# Patient Record
Sex: FEMALE | Race: BLACK OR AFRICAN AMERICAN | NOT HISPANIC OR LATINO | Employment: UNEMPLOYED | URBAN - METROPOLITAN AREA
[De-identification: names, ages, dates, MRNs, and addresses within clinical notes are randomized per-mention and may not be internally consistent; named-entity substitution may affect disease eponyms.]

---

## 2020-02-19 ENCOUNTER — HOSPITAL ENCOUNTER (OUTPATIENT)
Dept: RADIOLOGY | Facility: HOSPITAL | Age: 50
Discharge: HOME | End: 2020-02-19
Attending: ORTHOPAEDIC SURGERY
Payer: MEDICAID

## 2020-02-19 ENCOUNTER — OFFICE VISIT (OUTPATIENT)
Dept: ORTHOPEDICS | Facility: CLINIC | Age: 50
End: 2020-02-19
Payer: MEDICAID

## 2020-02-19 VITALS — BODY MASS INDEX: 19.14 KG/M2 | HEIGHT: 63 IN | WEIGHT: 108 LBS

## 2020-02-19 DIAGNOSIS — R52 PAIN: ICD-10-CM

## 2020-02-19 DIAGNOSIS — M79.671 CHRONIC HEEL PAIN, RIGHT: ICD-10-CM

## 2020-02-19 DIAGNOSIS — R52 PAIN: Primary | ICD-10-CM

## 2020-02-19 DIAGNOSIS — M21.6X1 CALCANEUS DEFORMITY, ACQUIRED, RIGHT: ICD-10-CM

## 2020-02-19 DIAGNOSIS — G89.29 CHRONIC HEEL PAIN, RIGHT: ICD-10-CM

## 2020-02-19 PROCEDURE — 99203 OFFICE O/P NEW LOW 30 MIN: CPT | Mod: S$PBB,,, | Performed by: ORTHOPAEDIC SURGERY

## 2020-02-19 PROCEDURE — 73650 X-RAY EXAM OF HEEL: CPT | Mod: 26,RT,, | Performed by: RADIOLOGY

## 2020-02-19 PROCEDURE — 99999 PR PBB SHADOW E&M-NEW PATIENT-LVL III: CPT | Mod: PBBFAC,,, | Performed by: ORTHOPAEDIC SURGERY

## 2020-02-19 PROCEDURE — 99203 OFFICE O/P NEW LOW 30 MIN: CPT | Mod: PBBFAC,25 | Performed by: ORTHOPAEDIC SURGERY

## 2020-02-19 PROCEDURE — 73650 X-RAY EXAM OF HEEL: CPT | Mod: TC,RT

## 2020-02-19 PROCEDURE — 99999 PR PBB SHADOW E&M-NEW PATIENT-LVL III: ICD-10-PCS | Mod: PBBFAC,,, | Performed by: ORTHOPAEDIC SURGERY

## 2020-02-19 PROCEDURE — 73650 XR CALCANEUS 2 VIEW RIGHT: ICD-10-PCS | Mod: 26,RT,, | Performed by: RADIOLOGY

## 2020-02-19 PROCEDURE — 99203 PR OFFICE/OUTPT VISIT, NEW, LEVL III, 30-44 MIN: ICD-10-PCS | Mod: S$PBB,,, | Performed by: ORTHOPAEDIC SURGERY

## 2020-02-19 RX ORDER — CARVEDILOL 12.5 MG/1
12.5 TABLET ORAL 2 TIMES DAILY
COMMUNITY

## 2020-02-19 RX ORDER — BENAZEPRIL HYDROCHLORIDE 20 MG/1
20 TABLET ORAL DAILY
COMMUNITY

## 2020-02-19 RX ORDER — LORATADINE 10 MG/1
10 TABLET ORAL DAILY
COMMUNITY

## 2020-02-19 RX ORDER — AMLODIPINE BESYLATE 10 MG/1
10 TABLET ORAL DAILY
COMMUNITY
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
## 2020-03-16 ENCOUNTER — TELEPHONE (OUTPATIENT)
Dept: ORTHOPEDICS | Facility: CLINIC | Age: 50
End: 2020-03-16

## 2020-03-16 NOTE — TELEPHONE ENCOUNTER
----- Message from Pawan Peña sent at 3/16/2020 10:27 AM CDT -----  Contact: self  Mg  Pt returning to call Mary concerning her surgery .    Contact

## 2020-03-19 ENCOUNTER — TELEPHONE (OUTPATIENT)
Dept: ORTHOPEDICS | Facility: CLINIC | Age: 50
End: 2020-03-19

## 2020-03-19 NOTE — TELEPHONE ENCOUNTER
Spoke to patient, postponed surgery and pre/post op appointments until further notice due to COVID 19 concerns. Patient verbalized understanding.

## 2020-05-08 ENCOUNTER — TELEPHONE (OUTPATIENT)
Dept: ORTHOPEDICS | Facility: CLINIC | Age: 50
End: 2020-05-08

## 2020-06-03 DIAGNOSIS — G89.29 CHRONIC HEEL PAIN, RIGHT: ICD-10-CM

## 2020-06-03 DIAGNOSIS — M79.671 CHRONIC HEEL PAIN, RIGHT: ICD-10-CM

## 2020-06-03 DIAGNOSIS — M21.6X1 CALCANEUS DEFORMITY, ACQUIRED, RIGHT: Primary | ICD-10-CM

## 2020-06-04 DIAGNOSIS — Z01.818 PRE-OP TESTING: Primary | ICD-10-CM

## 2020-06-15 ENCOUNTER — ANESTHESIA EVENT (OUTPATIENT)
Dept: SURGERY | Facility: HOSPITAL | Age: 50
End: 2020-06-15
Payer: MEDICAID

## 2020-06-15 NOTE — ANESTHESIA PREPROCEDURE EVALUATION
Chart review complete. Patient's medical history reviewed.  OK to proceed at [facility].   6/15/2020                                                                                                                   06/15/2020  Rosy Colin is a 50 y.o., female.    Active Ambulatory Problems     Diagnosis Date Noted    Calcaneus deformity, acquired, right 06/03/2020     Resolved Ambulatory Problems     Diagnosis Date Noted    No Resolved Ambulatory Problems     Past Medical History:   Diagnosis Date    Hypertension 2010       Anesthesia Evaluation    I have reviewed the Patient Summary Reports.    I have reviewed the Nursing Notes.       Review of Systems  Anesthesia Hx:  Denies Hx of Anesthetic complications    Social:  Non-Smoker    Cardiovascular:   Exercise tolerance: good Hypertension, well controlled  Denies Angina.        Pulmonary:   Denies COPD.  Denies Asthma.  Denies Shortness of breath.  Denies Sleep Apnea.    Renal/:   Denies Chronic Renal Disease.     Hepatic/GI:   Denies GERD. Denies Liver Disease.    Neurological:   Denies CVA. Denies Seizures.    Endocrine:   Denies Diabetes. Denies Hypothyroidism.        Physical Exam  General:  Well nourished    Airway/Jaw/Neck:  Airway Findings: Mallampati: III TM Distance: Normal, at least 6 cm  Jaw/Neck Findings:  Neck ROM: Normal ROM      Dental:  Dental Findings:    Chest/Lungs:  Chest/Lungs Clear    Heart/Vascular:  Heart Findings: Normal       Mental Status:  Mental Status Findings:  Cooperative, Alert and Oriented         Anesthesia Plan  Type of Anesthesia, risks & benefits discussed:  Anesthesia Type:  general  Patient's Preference: GA  Intra-op Monitoring Plan: standard ASA monitors  Intra-op Monitoring Plan Comments:   Post Op Pain Control Plan: multimodal analgesia, IV/PO Opiods PRN and per primary service following discharge from PACU  Post Op Pain Control Plan Comments:   Induction:   IV  Beta Blocker:  Patient is not currently on a Beta-Blocker (No  "further documentation required).       Informed Consent: Patient understands risks and agrees with Anesthesia plan.  Questions answered. Anesthesia consent signed with patient.  ASA Score: 2     Day of Surgery Review of History & Physical:    H&P update referred to the surgeon.     Anesthesia Plan Notes: The patient's PMH was reviewed and PE was performed  Plan for GETA  The patient reports 2 events related to lidocaine exposure (local infiltration x 1 and GI cocktail x 1), where she lost consciousness and reports some degree of HD instability as she reports "one time they almost did not get me back". 2 Prior anesthetic records reviewed and no local anesthetic was given. During her recent dental procedure they did "numb her up" with some medication but she says it did not work very well and did not last long.  It is unclear at this point if these events were true local anesthetic allergies. I suspect the dentist might be using a pamella LA given the short duration. We discussed the risks of a PNB given this possible lidocaine allergy and I recommended against having a nerve block today given the lack of information regarding her prior exposures. She is in agreement. I recommend she seek out allergy testing to truly determine the nature of her reaction        Ready For Surgery From Anesthesia Perspective.       "

## 2020-06-23 ENCOUNTER — TELEPHONE (OUTPATIENT)
Dept: ORTHOPEDICS | Facility: CLINIC | Age: 50
End: 2020-06-23

## 2020-06-23 ENCOUNTER — OFFICE VISIT (OUTPATIENT)
Dept: ORTHOPEDICS | Facility: CLINIC | Age: 50
End: 2020-06-23
Payer: MEDICAID

## 2020-06-23 VITALS
SYSTOLIC BLOOD PRESSURE: 123 MMHG | BODY MASS INDEX: 32.43 KG/M2 | WEIGHT: 183 LBS | TEMPERATURE: 98 F | HEIGHT: 63 IN | DIASTOLIC BLOOD PRESSURE: 85 MMHG | HEART RATE: 91 BPM

## 2020-06-23 DIAGNOSIS — Z01.818 PRE-OP TESTING: Primary | ICD-10-CM

## 2020-06-23 DIAGNOSIS — M21.6X1 CALCANEUS DEFORMITY, ACQUIRED, RIGHT: Primary | ICD-10-CM

## 2020-06-23 PROCEDURE — 99213 OFFICE O/P EST LOW 20 MIN: CPT | Mod: PBBFAC | Performed by: PHYSICIAN ASSISTANT

## 2020-06-23 PROCEDURE — 99499 UNLISTED E&M SERVICE: CPT | Mod: S$PBB,,, | Performed by: PHYSICIAN ASSISTANT

## 2020-06-23 PROCEDURE — 99999 PR PBB SHADOW E&M-EST. PATIENT-LVL III: CPT | Mod: PBBFAC,,, | Performed by: PHYSICIAN ASSISTANT

## 2020-06-23 PROCEDURE — 99499 NO LOS: ICD-10-PCS | Mod: S$PBB,,, | Performed by: PHYSICIAN ASSISTANT

## 2020-06-23 PROCEDURE — 99999 PR PBB SHADOW E&M-EST. PATIENT-LVL III: ICD-10-PCS | Mod: PBBFAC,,, | Performed by: PHYSICIAN ASSISTANT

## 2020-06-23 RX ORDER — TIZANIDINE 4 MG/1
TABLET ORAL
COMMUNITY
Start: 2020-06-03

## 2020-06-23 RX ORDER — DICLOFENAC SODIUM 10 MG/G
GEL TOPICAL
COMMUNITY
Start: 2020-06-17

## 2020-06-23 RX ORDER — IBUPROFEN 800 MG/1
TABLET ORAL
COMMUNITY
Start: 2020-06-16

## 2020-06-23 RX ORDER — CLINDAMYCIN HYDROCHLORIDE 150 MG/1
CAPSULE ORAL
COMMUNITY
Start: 2020-06-18

## 2020-06-23 RX ORDER — MONTELUKAST SODIUM 10 MG/1
10 TABLET ORAL
COMMUNITY
Start: 2018-09-23

## 2020-06-23 RX ORDER — ALBUTEROL SULFATE 90 UG/1
2 AEROSOL, METERED RESPIRATORY (INHALATION)
COMMUNITY
Start: 2018-10-31

## 2020-06-23 RX ORDER — CHLORHEXIDINE GLUCONATE ORAL RINSE 1.2 MG/ML
SOLUTION DENTAL
COMMUNITY
Start: 2020-06-18

## 2020-06-23 RX ORDER — CLONIDINE HYDROCHLORIDE 0.1 MG/1
TABLET ORAL
COMMUNITY
Start: 2020-06-03

## 2020-06-24 ENCOUNTER — LAB VISIT (OUTPATIENT)
Dept: SPORTS MEDICINE | Facility: CLINIC | Age: 50
End: 2020-06-24
Payer: MEDICAID

## 2020-06-24 ENCOUNTER — TELEPHONE (OUTPATIENT)
Dept: ORTHOPEDICS | Facility: CLINIC | Age: 50
End: 2020-06-24

## 2020-06-24 DIAGNOSIS — Z01.818 PRE-OP TESTING: ICD-10-CM

## 2020-06-24 PROCEDURE — U0003 INFECTIOUS AGENT DETECTION BY NUCLEIC ACID (DNA OR RNA); SEVERE ACUTE RESPIRATORY SYNDROME CORONAVIRUS 2 (SARS-COV-2) (CORONAVIRUS DISEASE [COVID-19]), AMPLIFIED PROBE TECHNIQUE, MAKING USE OF HIGH THROUGHPUT TECHNOLOGIES AS DESCRIBED BY CMS-2020-01-R: HCPCS

## 2020-06-24 RX ORDER — OXYCODONE AND ACETAMINOPHEN 5; 325 MG/1; MG/1
1 TABLET ORAL
Qty: 42 TABLET | Refills: 0 | Status: SHIPPED | OUTPATIENT
Start: 2020-06-24 | End: 2020-06-26 | Stop reason: SDUPTHER

## 2020-06-24 NOTE — PLAN OF CARE
Arrival time 0800 given.  [place] address  and [address] provided    Covid pending    Medications reviewed.- only needs to hold lotensin    Non smoker    NPO, bathing instructions, visitor policy reviewedd.    Voiced understanding.    Opportunity for questions.

## 2020-06-24 NOTE — PROGRESS NOTES
Rosy is a 50 y.o. female here today for a pre-operative visit in preparation for a  OSTECTOMY calcaneus Right    to be performed by  on 06/25/2020.  she was last seen and treated in the clinic on 02/19/2020.  she has since seen their primary care for optimization of the chronic health concerns.  There has been no significant change in their past medical or orthopedic status since the previous visit.  No fever, chills, malaise or unexplained weight change.     Allergies, medications, past medical history and past surgical history were reviewed with the patient.     Physical examination was performed.     Consents were signed.   Patient has walker and will bring with them the day of surgery. They have been counseled on proper use of the assistive device.     -Discussed COVID19 with the patient, they are aware of our current policies and procedures, were given the option of delaying surgery, and they elect to proceed. Covid testing to be done 48 hours prior to surgery.      Pre, redd, and post operative procedure and expectations were discussed.  Questions were answered. The patient has been educated and is ready to proceed with surgery.  Approximately 30 minutes was spent discussing surgical outcomes, plans, procedures, pre, redd, and post operative expectations and care. The risks, benefits and alternatives to surgery were discussed with the patient at great length.  These include bleeding, infection, vessel/nerve damage, pain, numbness, tingling, complex regional pain syndrome, hardware/surgical failure, need for further surgery, malunion, nonunion, DVT, PE, arthritis and death. He also understands that the risks of surgery may be greater for some patients due to health or lifestyle issues, such as a current condition or a history of heart disease, obesity, clotting disorders, recurrent infections, smoking, sedentary lifestyle, or noncompliance with medications, therapy, or followup. The degree of the  increased risk is hard to estimate with any degree of precision.  Patient states an understanding and wishes to proceed with surgery.   All questions were answered.  No guarantees were implied or stated.  Informed consent was obtained  The patient will contact us if the have any questions, concerns, and changes in their medical condition prior to surgery.

## 2020-06-24 NOTE — H&P
Subjective:      Patient ID: Rosy Colin is a 50 y.o. female.    Chief Complaint: No chief complaint on file.    Rosy is a 50 y.o. female here today for a pre-operative visit in preparation for a  OSTECTOMY calcaneus Right    to be performed by  on 06/25/2020.  she was last seen and treated in the clinic on 02/19/2020.  she has since seen their primary care for optimization of the chronic health concerns.  There has been no significant change in their past medical or orthopedic status since the previous visit.  No fever, chills, malaise or unexplained weight change.     Past Medical History:   Diagnosis Date    Hypertension 2010     Past Surgical History:   Procedure Laterality Date    ANKLE FRACTURE SURGERY Right 11/15/2018    jaw bone surgery Bilateral 11/18/20    plate under right and left eye; jaw bone repaired on right side    left clavical surgery Left 11/15/2015    PATELLA SURGERY Right 11/15/20     Social History     Occupational History    Not on file   Tobacco Use    Smoking status: Never Smoker    Smokeless tobacco: Never Used   Substance and Sexual Activity    Alcohol use: Never     Frequency: Never    Drug use: Never    Sexual activity: Not on file      ROS  Current Outpatient Medications on File Prior to Visit   Medication Sig Dispense Refill    amLODIPine (NORVASC) 10 MG tablet Take 10 mg by mouth once daily.      benazepriL (LOTENSIN) 20 MG tablet Take 20 mg by mouth once daily.      carvediloL (COREG) 12.5 MG tablet Take 12.5 mg by mouth 2 (two) times daily.      chlorhexidine (PERIDEX) 0.12 % solution       clindamycin (CLEOCIN) 150 MG capsule       diclofenac sodium (VOLTAREN) 1 % Gel APPLY TO THE PAINFUL JOINTS UP TO 4 TIMES A DAY      ibuprofen (ADVIL,MOTRIN) 800 MG tablet       loratadine (CLARITIN) 10 mg tablet Take 10 mg by mouth once daily.      tiZANidine (ZANAFLEX) 4 MG tablet       albuterol (PROVENTIL/VENTOLIN HFA) 90 mcg/actuation inhaler Inhale 2 puffs  "into the lungs.      cloNIDine (CATAPRES) 0.1 MG tablet       montelukast (SINGULAIR) 10 mg tablet Take 10 mg by mouth.       No current facility-administered medications on file prior to visit.      Review of patient's allergies indicates:   Allergen Reactions    Codeine     Lidocaine          Objective:      Vitals:    06/23/20 1324   BP: 123/85   BP Location: Left arm   Patient Position: Sitting   BP Method: Large (Automatic)   Pulse: 91   Temp: 98.2 °F (36.8 °C)   TempSrc: Oral   Weight: 83 kg (183 lb)   Height: 5' 3" (1.6 m)     Ortho Exam     Gen: WD, WN in NAD   HEENT: NC/AT   Heart: RR   Resp: unlabored breathing   Skin: intact, no lesions pertinent to the surgery site    Assessment:       1. Calcaneus deformity, acquired, right, sequelae of calcaneus fracture, plantar osteophyte          Plan:       Surgical intervention per       "

## 2020-06-24 NOTE — TELEPHONE ENCOUNTER
Spoke to patient, gave her surgery arrival time of 8am and reminded her not to eat or drink after midnight. Patient verbalized understanding.

## 2020-06-25 ENCOUNTER — HOSPITAL ENCOUNTER (OUTPATIENT)
Facility: HOSPITAL | Age: 50
Discharge: HOME | End: 2020-06-25
Attending: ORTHOPAEDIC SURGERY | Admitting: ORTHOPAEDIC SURGERY
Payer: MEDICAID

## 2020-06-25 ENCOUNTER — ANESTHESIA (OUTPATIENT)
Dept: SURGERY | Facility: HOSPITAL | Age: 50
End: 2020-06-25
Payer: MEDICAID

## 2020-06-25 DIAGNOSIS — M21.6X1 CALCANEUS DEFORMITY, ACQUIRED, RIGHT: Primary | ICD-10-CM

## 2020-06-25 DIAGNOSIS — M21.6X1 CALCANEUS DEFORMITY, ACQUIRED, RIGHT: ICD-10-CM

## 2020-06-25 DIAGNOSIS — G89.29 CHRONIC HEEL PAIN, RIGHT: ICD-10-CM

## 2020-06-25 DIAGNOSIS — M79.671 CHRONIC HEEL PAIN, RIGHT: ICD-10-CM

## 2020-06-25 LAB — SARS-COV-2 RNA RESP QL NAA+PROBE: NOT DETECTED

## 2020-06-25 PROCEDURE — S0028 INJECTION, FAMOTIDINE, 20 MG: HCPCS | Performed by: NURSE ANESTHETIST, CERTIFIED REGISTERED

## 2020-06-25 PROCEDURE — 63600175 PHARM REV CODE 636 W HCPCS: Performed by: NURSE ANESTHETIST, CERTIFIED REGISTERED

## 2020-06-25 PROCEDURE — 28119 PR REMOVAL OF HEEL SPUR: ICD-10-PCS | Mod: RT,,, | Performed by: ORTHOPAEDIC SURGERY

## 2020-06-25 PROCEDURE — 25000003 PHARM REV CODE 250: Performed by: NURSE ANESTHETIST, CERTIFIED REGISTERED

## 2020-06-25 PROCEDURE — 01480 ANES OPEN PX LOWER L/A/F NOS: CPT | Performed by: ORTHOPAEDIC SURGERY

## 2020-06-25 PROCEDURE — 71000033 HC RECOVERY, INTIAL HOUR: Performed by: ORTHOPAEDIC SURGERY

## 2020-06-25 PROCEDURE — 36000708 HC OR TIME LEV III 1ST 15 MIN: Performed by: ORTHOPAEDIC SURGERY

## 2020-06-25 PROCEDURE — D9220A PRA ANESTHESIA: ICD-10-PCS | Mod: ANES,,, | Performed by: ANESTHESIOLOGY

## 2020-06-25 PROCEDURE — 25000003 PHARM REV CODE 250: Performed by: ANESTHESIOLOGY

## 2020-06-25 PROCEDURE — 99900035 HC TECH TIME PER 15 MIN (STAT)

## 2020-06-25 PROCEDURE — D9220A PRA ANESTHESIA: Mod: CRNA,,, | Performed by: NURSE ANESTHETIST, CERTIFIED REGISTERED

## 2020-06-25 PROCEDURE — 63600175 PHARM REV CODE 636 W HCPCS: Performed by: ORTHOPAEDIC SURGERY

## 2020-06-25 PROCEDURE — 37000008 HC ANESTHESIA 1ST 15 MINUTES: Performed by: ORTHOPAEDIC SURGERY

## 2020-06-25 PROCEDURE — D9220A PRA ANESTHESIA: ICD-10-PCS | Mod: CRNA,,, | Performed by: NURSE ANESTHETIST, CERTIFIED REGISTERED

## 2020-06-25 PROCEDURE — 94761 N-INVAS EAR/PLS OXIMETRY MLT: CPT

## 2020-06-25 PROCEDURE — 71000039 HC RECOVERY, EACH ADD'L HOUR: Performed by: ORTHOPAEDIC SURGERY

## 2020-06-25 PROCEDURE — 25000003 PHARM REV CODE 250: Performed by: ORTHOPAEDIC SURGERY

## 2020-06-25 PROCEDURE — 63600175 PHARM REV CODE 636 W HCPCS: Performed by: ANESTHESIOLOGY

## 2020-06-25 PROCEDURE — 28119 REMOVAL OF HEEL SPUR: CPT | Mod: RT,,, | Performed by: ORTHOPAEDIC SURGERY

## 2020-06-25 PROCEDURE — 71000015 HC POSTOP RECOV 1ST HR: Performed by: ORTHOPAEDIC SURGERY

## 2020-06-25 PROCEDURE — 36000709 HC OR TIME LEV III EA ADD 15 MIN: Performed by: ORTHOPAEDIC SURGERY

## 2020-06-25 PROCEDURE — 37000009 HC ANESTHESIA EA ADD 15 MINS: Performed by: ORTHOPAEDIC SURGERY

## 2020-06-25 PROCEDURE — 27201423 OPTIME MED/SURG SUP & DEVICES STERILE SUPPLY: Performed by: ORTHOPAEDIC SURGERY

## 2020-06-25 PROCEDURE — D9220A PRA ANESTHESIA: Mod: ANES,,, | Performed by: ANESTHESIOLOGY

## 2020-06-25 RX ORDER — FENTANYL CITRATE 50 UG/ML
INJECTION, SOLUTION INTRAMUSCULAR; INTRAVENOUS
Status: DISCONTINUED | OUTPATIENT
Start: 2020-06-25 | End: 2020-06-25

## 2020-06-25 RX ORDER — MORPHINE SULFATE 2 MG/ML
2 INJECTION, SOLUTION INTRAMUSCULAR; INTRAVENOUS
Status: DISCONTINUED | OUTPATIENT
Start: 2020-06-25 | End: 2020-06-25 | Stop reason: HOSPADM

## 2020-06-25 RX ORDER — DEXAMETHASONE SODIUM PHOSPHATE 4 MG/ML
INJECTION, SOLUTION INTRA-ARTICULAR; INTRALESIONAL; INTRAMUSCULAR; INTRAVENOUS; SOFT TISSUE
Status: DISCONTINUED | OUTPATIENT
Start: 2020-06-25 | End: 2020-06-25

## 2020-06-25 RX ORDER — FENTANYL CITRATE 50 UG/ML
25 INJECTION, SOLUTION INTRAMUSCULAR; INTRAVENOUS EVERY 5 MIN PRN
Status: DISCONTINUED | OUTPATIENT
Start: 2020-06-25 | End: 2020-06-25 | Stop reason: HOSPADM

## 2020-06-25 RX ORDER — PROPOFOL 10 MG/ML
VIAL (ML) INTRAVENOUS
Status: DISCONTINUED | OUTPATIENT
Start: 2020-06-25 | End: 2020-06-25

## 2020-06-25 RX ORDER — SODIUM CHLORIDE 0.9 % (FLUSH) 0.9 %
3 SYRINGE (ML) INJECTION EVERY 6 HOURS
Status: DISCONTINUED | OUTPATIENT
Start: 2020-06-25 | End: 2020-06-25 | Stop reason: HOSPADM

## 2020-06-25 RX ORDER — NEOSTIGMINE METHYLSULFATE 1 MG/ML
INJECTION, SOLUTION INTRAVENOUS
Status: DISCONTINUED | OUTPATIENT
Start: 2020-06-25 | End: 2020-06-25

## 2020-06-25 RX ORDER — ONDANSETRON 2 MG/ML
INJECTION INTRAMUSCULAR; INTRAVENOUS
Status: DISCONTINUED | OUTPATIENT
Start: 2020-06-25 | End: 2020-06-25

## 2020-06-25 RX ORDER — ACETAMINOPHEN 500 MG
1000 TABLET ORAL
Status: COMPLETED | OUTPATIENT
Start: 2020-06-25 | End: 2020-06-25

## 2020-06-25 RX ORDER — OXYCODONE HYDROCHLORIDE 5 MG/1
10 TABLET ORAL
Status: DISCONTINUED | OUTPATIENT
Start: 2020-06-25 | End: 2020-06-25 | Stop reason: HOSPADM

## 2020-06-25 RX ORDER — GLYCOPYRROLATE 0.2 MG/ML
INJECTION INTRAMUSCULAR; INTRAVENOUS
Status: DISCONTINUED | OUTPATIENT
Start: 2020-06-25 | End: 2020-06-25

## 2020-06-25 RX ORDER — MIDAZOLAM HYDROCHLORIDE 1 MG/ML
INJECTION, SOLUTION INTRAMUSCULAR; INTRAVENOUS
Status: DISCONTINUED | OUTPATIENT
Start: 2020-06-25 | End: 2020-06-25

## 2020-06-25 RX ORDER — SODIUM CHLORIDE 450 MG/100ML
INJECTION, SOLUTION INTRAVENOUS CONTINUOUS
Status: CANCELLED | OUTPATIENT
Start: 2020-06-25

## 2020-06-25 RX ORDER — SODIUM CHLORIDE 9 MG/ML
INJECTION, SOLUTION INTRAVENOUS CONTINUOUS
Status: DISCONTINUED | OUTPATIENT
Start: 2020-06-25 | End: 2020-06-25 | Stop reason: HOSPADM

## 2020-06-25 RX ORDER — HALOPERIDOL 5 MG/ML
0.5 INJECTION INTRAMUSCULAR ONCE AS NEEDED
Status: COMPLETED | OUTPATIENT
Start: 2020-06-25 | End: 2020-06-25

## 2020-06-25 RX ORDER — CELECOXIB 200 MG/1
400 CAPSULE ORAL ONCE
Status: COMPLETED | OUTPATIENT
Start: 2020-06-25 | End: 2020-06-25

## 2020-06-25 RX ORDER — FAMOTIDINE 10 MG/ML
INJECTION INTRAVENOUS
Status: DISCONTINUED | OUTPATIENT
Start: 2020-06-25 | End: 2020-06-25

## 2020-06-25 RX ORDER — KETAMINE HYDROCHLORIDE 100 MG/ML
INJECTION, SOLUTION INTRAMUSCULAR; INTRAVENOUS
Status: DISCONTINUED | OUTPATIENT
Start: 2020-06-25 | End: 2020-06-25

## 2020-06-25 RX ORDER — CEFAZOLIN SODIUM 1 G/3ML
2 INJECTION, POWDER, FOR SOLUTION INTRAMUSCULAR; INTRAVENOUS
Status: COMPLETED | OUTPATIENT
Start: 2020-06-25 | End: 2020-06-25

## 2020-06-25 RX ORDER — OXYCODONE HYDROCHLORIDE 5 MG/1
10 TABLET ORAL EVERY 4 HOURS PRN
Status: DISCONTINUED | OUTPATIENT
Start: 2020-06-25 | End: 2020-06-25 | Stop reason: HOSPADM

## 2020-06-25 RX ADMIN — PROPOFOL 150 MG: 10 INJECTION, EMULSION INTRAVENOUS at 09:06

## 2020-06-25 RX ADMIN — SODIUM CHLORIDE 1000 ML: 0.9 INJECTION, SOLUTION INTRAVENOUS at 09:06

## 2020-06-25 RX ADMIN — CEFAZOLIN 2 G: 330 INJECTION, POWDER, FOR SOLUTION INTRAMUSCULAR; INTRAVENOUS at 07:06

## 2020-06-25 RX ADMIN — FENTANYL CITRATE 50 MCG: 50 INJECTION, SOLUTION INTRAMUSCULAR; INTRAVENOUS at 09:06

## 2020-06-25 RX ADMIN — ONDANSETRON 4 MG: 2 INJECTION INTRAMUSCULAR; INTRAVENOUS at 10:06

## 2020-06-25 RX ADMIN — MIDAZOLAM 2 MG: 1 INJECTION INTRAMUSCULAR; INTRAVENOUS at 09:06

## 2020-06-25 RX ADMIN — SUGAMMADEX 200 MG: 100 INJECTION, SOLUTION INTRAVENOUS at 10:06

## 2020-06-25 RX ADMIN — HALOPERIDOL LACTATE 0.5 MG: 5 INJECTION, SOLUTION INTRAMUSCULAR at 01:06

## 2020-06-25 RX ADMIN — GLYCOPYRROLATE 0.2 MG: 0.2 INJECTION, SOLUTION INTRAMUSCULAR; INTRAVENOUS at 09:06

## 2020-06-25 RX ADMIN — FENTANYL CITRATE 50 MCG: 50 INJECTION, SOLUTION INTRAMUSCULAR; INTRAVENOUS at 10:06

## 2020-06-25 RX ADMIN — NEOSTIGMINE METHYLSULFATE 5 MG: 1 INJECTION INTRAVENOUS at 10:06

## 2020-06-25 RX ADMIN — FENTANYL CITRATE 25 MCG: 50 INJECTION INTRAMUSCULAR; INTRAVENOUS at 11:06

## 2020-06-25 RX ADMIN — FENTANYL CITRATE 25 MCG: 50 INJECTION INTRAMUSCULAR; INTRAVENOUS at 12:06

## 2020-06-25 RX ADMIN — DEXAMETHASONE SODIUM PHOSPHATE 8 MG: 4 INJECTION, SOLUTION INTRAMUSCULAR; INTRAVENOUS at 10:06

## 2020-06-25 RX ADMIN — SODIUM CHLORIDE, SODIUM GLUCONATE, SODIUM ACETATE, POTASSIUM CHLORIDE, MAGNESIUM CHLORIDE, SODIUM PHOSPHATE, DIBASIC, AND POTASSIUM PHOSPHATE: .53; .5; .37; .037; .03; .012; .00082 INJECTION, SOLUTION INTRAVENOUS at 10:06

## 2020-06-25 RX ADMIN — KETAMINE HYDROCHLORIDE 30 MG: 100 INJECTION, SOLUTION, CONCENTRATE INTRAMUSCULAR; INTRAVENOUS at 09:06

## 2020-06-25 RX ADMIN — CELECOXIB 400 MG: 200 CAPSULE ORAL at 08:06

## 2020-06-25 RX ADMIN — ACETAMINOPHEN 1000 MG: 500 TABLET ORAL at 08:06

## 2020-06-25 RX ADMIN — FAMOTIDINE 20 MG: 10 INJECTION, SOLUTION INTRAVENOUS at 09:06

## 2020-06-25 RX ADMIN — OXYCODONE HYDROCHLORIDE 10 MG: 5 TABLET ORAL at 11:06

## 2020-06-25 RX ADMIN — GLYCOPYRROLATE 0.6 MG: 0.2 INJECTION, SOLUTION INTRAMUSCULAR; INTRAVENOUS at 10:06

## 2020-06-25 NOTE — PLAN OF CARE
Spoke with , per lab,  specimen will be resulted between 3 and 4 am.  Informed lab patient is having surgery in am and COVID results are needed prior to admission  Into the building.  Delays in results could delay surgery. Tech aware and confirms test will be resulted.

## 2020-06-25 NOTE — PLAN OF CARE
Spoke to manager of [facility] April, concerning processing 's Covid lab which is still not resulted.  She will find the specimen and run a one hour test.  She will call with result to my cell phone.

## 2020-06-25 NOTE — PLAN OF CARE
Spoke with lab concerning covid results.  States went to [facility] at 1908 last pm.  With a 7 hour turn around time results should be .available shortly

## 2020-06-25 NOTE — PLAN OF CARE
Discharged instructions reviewed with patient and family. Patient denies any pain or nausea at present. AVS given and RX'S given pre op consents in [place]. PTable to void with no difficulties .Ordered DME provided to patient. Verbal and written instructions were given to the patient and a competent caregiver on proper usage. Also educated on the risk of falling if DME is not used/not used properly. All parties verbalized understanding.

## 2020-06-25 NOTE — DISCHARGE INSTRUCTIONS
Discharge Instructions: Using a Walker  Your healthcare provider as prescribed a walker for you. To use your walker, you need to learn a new gait, or way to walk. Your healthcare provider will tell you to use either a non-weight-bearing gait (putting no weight on one leg and foot) or a weight-bearing gait (putting weight on both legs and feet).  Guidelines for use  Tips for use include the following:   · Remove throw rugs, electrical cords, and anything else that may cause you to fall.  · Arrange your household to keep the items you need handy. Keep everything else out of the way.  · Use a backpack, briseiad pack, apron, or pockets to carry things so you keep your hands free.  Non-weight-bearing method  Steps for this method include:  · Hold your injured (weaker) foot off the floor.  · Lift the walker (roll it if youre using a wheeled walker).  · Move the walker forward about 12 inches.  · Support your weight on your hands.  · Swing your good (stronger) foot forward to the center of the walker.       Weight-bearing method  Steps for this method include:  · Roll the walker (lift it if youre using a walker without wheels).  · Move the walker forward about 12 inches.  · Step forward with your injured leg, new joint, or weaker side first.  · Use the walker to help you keep your balance as you take the step.  · Bring your other foot forward to the center of the walker.       Date Last Reviewed: 8/1/2016  © 9947-2310 [employer]. [address] [place] [address], [place] [address]. All rights reserved. This information is not intended as a substitute for professional medical care. Always follow your healthcare professional's instructions.          PATIENT INSTRUCTIONS  POST-ANESTHESIA    IMMEDIATELY FOLLOWING SURGERY:  Do not drive or operate machinery for the first twenty four hours after surgery.  Do not make any important decisions for twenty four hours after surgery or while taking narcotic pain medications or  sedatives.  If you develop intractable nausea and vomiting or a severe headache please notify your doctor immediately.    FOLLOW-UP:  Please make an appointment with your surgeon as instructed. You do not need to follow up with anesthesia unless specifically instructed to do so.    WOUND CARE INSTRUCTIONS (if applicable):  Keep a dry clean dressing on the anesthesia/puncture wound site if there is drainage.  Once the wound has quit draining you may leave it open to air.  Generally you should leave the bandage intact for twenty four hours unless there is drainage.  If the epidural site drains for more than 36-48 hours please call the anesthesia department.    QUESTIONS?:  Please feel free to call your physician or the hospital  if you have any questions, and they will be happy to assist you.       [facility] Anesthesia Department  [address] [facility]  [phone]

## 2020-06-25 NOTE — PROGRESS NOTES
1320-Pt actively vomiting. Dr. Darby notified. States will place orders for medication.  1345- pt states nausea improved. O2 sats improved off oxygen. Pt prepared for DC home

## 2020-06-25 NOTE — TRANSFER OF CARE
"Anesthesia Transfer of Care Note    Patient: Rosy Colin    Procedure(s) Performed: Procedure(s) (LRB):  OSTECTOMY calcaneus (Right)    Patient location: PACU    Anesthesia Type: general    Transport from OR: Transported from OR on 6-10 L/min O2 by face mask with adequate spontaneous ventilation    Post pain: adequate analgesia    Post assessment: no apparent anesthetic complications and tolerated procedure well    Post vital signs: stable    Level of consciousness: sedated and responds to stimulation    Nausea/Vomiting: no nausea/vomiting    Complications: none    Transfer of care protocol was followed      Last vitals:   Visit Vitals  /88 (BP Location: Left arm, Patient Position: Lying)   Pulse 66   Temp 36.3 °C (97.3 °F) (Temporal)   Resp 20   Ht 5' 3" (1.6 m)   Wt 83 kg (183 lb)   LMP  (LMP Unknown)   SpO2 100%   Breastfeeding No   BMI 32.42 kg/m²     "

## 2020-06-25 NOTE — INTERVAL H&P NOTE
The patient has been examined and the H&P has been reviewed:    I concur with the findings and no changes have occurred since H&P was written.    Anesthesia/Surgery risks, benefits and alternative options discussed and understood by patient/family.          Active Hospital Problems    Diagnosis  POA    Calcaneus deformity, acquired, right [M21.6X1]  Yes      Resolved Hospital Problems   No resolved problems to display.

## 2020-06-25 NOTE — OP NOTE
Operative report    Date of surgery:  06/25/2020    Preoperative diagnosis:  Painful exostosis right calcaneus    Postoperative diagnosis:  Same    Procedure:  Ostectomy right calcaneus    Anesthesia:  General    Surgeon:  Dr. Le  Assistant:  Dr. Guerra    Complications:  None  Estimated blood loss:  None  Specimens:  None    Procedure in detail:        Patient is brought to the operating room where general anesthesia was administered.  The patient was placed in a prone position on the operating table.  Her right leg was prepped and draped in sterile fashion with a tourniquet about the right thigh.  An incision was made on the plantar aspect of the heel centered over the bony prominence due to a previous calcaneus fracture.  Incision was carried through the skin and subcutaneous tissue and fat down to the periosteum over the bony prominence.  This was incised sharply and a periosteal elevator was used to expose the bony prominence.  A microsagittal saw and osteotome were used to remove the bulk of the exostosis.  A reciprocating rasp was used to smooth down the rough edges.  Once the ostectomy was completed, the wound was well irrigated.  The deep subcutaneous tissue was closed with 0 Vicryl suture and 2 0 Vicryl suture.  The skin incision was closed with interrupted 2 0 nylon suture.  A sterile compressive dressing was placed and the patient was returned to the postop holding area in stable condition

## 2020-06-25 NOTE — ANESTHESIA PROCEDURE NOTES
Intubation  Performed by: Sara Casillas CRNA  Authorized by: Mykel Darby MD     Intubation:     Induction:  Intravenous    Intubated:  Postinduction    Mask Ventilation:  Easy mask    Attempts:  1    Attempted By:  FIFI    Blade:  Sotelo 3    Laryngeal View Grade: Grade I - full view of chords      Difficult Airway Encountered?: No      Complications:  None    Airway Device Size:  7.0    Style/Cuff Inflation:  Cuffed    Inflation Amount (mL):  6    Tube secured:  21    Secured at:  The lips    Placement Verified By:  Capnometry    DIFFICULT INTUBATION DESCRIPTOR: sutures upper right gum intact post intubation.    Findings Post-Intubation:  BS equal bilateral

## 2020-06-25 NOTE — PLAN OF CARE
Pre op complete, family not in facility- waiting in car. Pt needs a walker at discharge. Belongings in locker 9

## 2020-06-26 VITALS
DIASTOLIC BLOOD PRESSURE: 73 MMHG | RESPIRATION RATE: 18 BRPM | HEIGHT: 63 IN | SYSTOLIC BLOOD PRESSURE: 117 MMHG | WEIGHT: 183 LBS | BODY MASS INDEX: 32.43 KG/M2 | TEMPERATURE: 97 F | OXYGEN SATURATION: 98 % | HEART RATE: 56 BPM

## 2020-06-26 RX ORDER — OXYCODONE AND ACETAMINOPHEN 5; 325 MG/1; MG/1
1 TABLET ORAL
Qty: 42 TABLET | Refills: 0 | Status: SHIPPED | OUTPATIENT
Start: 2020-06-26

## 2020-06-26 NOTE — TELEPHONE ENCOUNTER
Spoke to patient, stated she received 3 days of pain medication prior to surgery and states her pharmacy needs a new Rx for the remainder of the Rx. Routed refill request to .

## 2020-06-26 NOTE — ANESTHESIA POSTPROCEDURE EVALUATION
Anesthesia Post Evaluation    Patient: Rosy Colin    Procedure(s) Performed: Procedure(s) (LRB):  OSTECTOMY calcaneus (Right)    Final Anesthesia Type: general    Patient location during evaluation: PACU  Patient participation: Yes- Able to Participate  Level of consciousness: awake and alert  Post-procedure vital signs: reviewed and stable  Pain management: adequate  Airway patency: patent  HUMBLE mitigation strategies: Multimodal analgesia  PONV status at discharge: nausea (controlled)  Anesthetic complications: no      Cardiovascular status: blood pressure returned to baseline  Respiratory status: unassisted, spontaneous ventilation and room air  Hydration status: euvolemic  Follow-up not needed.          Vitals Value Taken Time   /73 06/25/20 1347   Temp 36.3 °C (97.3 °F) 06/25/20 1104   Pulse 66 06/25/20 1359   Resp 18 06/25/20 1345   SpO2 98 % 06/25/20 1359   Vitals shown include unvalidated device data.      Event Time   Out of Recovery 13:30:00         Pain/Scotty Score: Pain Rating Prior to Med Admin: 6 (6/25/2020 12:14 PM)  Pain Rating Post Med Admin: 3 (6/25/2020 11:55 AM)  Scotty Score: 9 (6/25/2020  1:30 PM)

## 2020-07-10 ENCOUNTER — OFFICE VISIT (OUTPATIENT)
Dept: ORTHOPEDICS | Facility: CLINIC | Age: 50
End: 2020-07-10
Payer: MEDICAID

## 2020-07-10 DIAGNOSIS — Z09 FOLLOW-UP EXAMINATION AFTER ORTHOPEDIC SURGERY: Primary | ICD-10-CM

## 2020-07-10 PROCEDURE — 99024 PR POST-OP FOLLOW-UP VISIT: ICD-10-PCS | Mod: ,,, | Performed by: ORTHOPAEDIC SURGERY

## 2020-07-10 PROCEDURE — 99999 PR PBB SHADOW E&M-EST. PATIENT-LVL I: CPT | Mod: PBBFAC,,, | Performed by: ORTHOPAEDIC SURGERY

## 2020-07-10 PROCEDURE — 99999 PR PBB SHADOW E&M-EST. PATIENT-LVL I: ICD-10-PCS | Mod: PBBFAC,,, | Performed by: ORTHOPAEDIC SURGERY

## 2020-07-10 PROCEDURE — 99024 POSTOP FOLLOW-UP VISIT: CPT | Mod: ,,, | Performed by: ORTHOPAEDIC SURGERY

## 2020-07-10 PROCEDURE — 99211 OFF/OP EST MAY X REQ PHY/QHP: CPT | Mod: PBBFAC | Performed by: ORTHOPAEDIC SURGERY

## 2020-07-10 NOTE — PROGRESS NOTES
Rosy Colin  Returns today for follow-up.  She is 2 weeks postop from ostectomy from the plantar aspect of her right calcaneus.  She reports that her pain is well controlled.  She has been nonweightbearing on her heel.    Examination:  I removed her dressing today.  Her incision is well healed.  I removed her sutures today.    Impression:  2 weeks postop right calcaneus plantar ostectomy    Recommendation:  I would like her to continue avoiding weight on her heels much as possible over the next couple of weeks.  She can begin to progress weight-bearing once there is no pain.    Follow-up as needed

## 2020-07-27 ENCOUNTER — OFFICE VISIT (OUTPATIENT)
Dept: ORTHOPEDICS | Facility: CLINIC | Age: 50
End: 2020-07-27
Payer: MEDICAID

## 2020-07-27 ENCOUNTER — TELEPHONE (OUTPATIENT)
Dept: ORTHOPEDICS | Facility: CLINIC | Age: 50
End: 2020-07-27

## 2020-07-27 VITALS — WEIGHT: 183 LBS | HEIGHT: 63 IN | BODY MASS INDEX: 32.43 KG/M2

## 2020-07-27 DIAGNOSIS — Z09 FOLLOW-UP EXAMINATION AFTER ORTHOPEDIC SURGERY: Primary | ICD-10-CM

## 2020-07-27 PROCEDURE — 99024 PR POST-OP FOLLOW-UP VISIT: ICD-10-PCS | Mod: ,,, | Performed by: PHYSICIAN ASSISTANT

## 2020-07-27 PROCEDURE — 99999 PR PBB SHADOW E&M-EST. PATIENT-LVL III: ICD-10-PCS | Mod: PBBFAC,,, | Performed by: PHYSICIAN ASSISTANT

## 2020-07-27 PROCEDURE — 99999 PR PBB SHADOW E&M-EST. PATIENT-LVL III: CPT | Mod: PBBFAC,,, | Performed by: PHYSICIAN ASSISTANT

## 2020-07-27 PROCEDURE — 99213 OFFICE O/P EST LOW 20 MIN: CPT | Mod: PBBFAC | Performed by: PHYSICIAN ASSISTANT

## 2020-07-27 PROCEDURE — 99024 POSTOP FOLLOW-UP VISIT: CPT | Mod: ,,, | Performed by: PHYSICIAN ASSISTANT

## 2020-07-27 NOTE — TELEPHONE ENCOUNTER
Spoke to pt and informed her  is out but I spoke to  and she wants her to come in and be seen, pt is coming to see real to be examined.----- Message from Tanner Mackey sent at 7/27/2020 12:22 PM CDT -----  Regarding: POST OP ISSUES  Contact: Self  Pt states she have drainage w/ puss coming from surgery site and site split open in the middle of site ask for a call    Contact info  [phone]

## 2020-07-27 NOTE — PROGRESS NOTES
Ms. Colin is here today for a post-operative visit after an Ostectomy right calcaneus  by Dr. Le on 6-.  she reports that she is doing okay.  Pain is mild.  she is taking Ibuprofen 800 mg prn for pain with good relief.  she denies fever, chills, and sweats since the time of the surgery. She has been trying to progress her WB in a post-op shoe or slipper. She says her incision opened up yesterday and there was a little bloody drainage. She did have some increased pain yesterday but the Ibuprofen helped. She reports mild pain at 3/10 at this time. She says there was some pus drainage from the incision this morning. She is partial WB with a walker. She has been taking showers. There was no recent injury. She has been doing daily dressing changes, and says the gauze sometimes sticks to her incision and the scabs will fall off when she removes the dressing.     Physical exam: Plantar incision has opened up to reveal the superficial fatty tissue. Middle of incision appears somewhat macerated. One pinpoint spot of serosanguinous drainage noted on gauze. No increased warmth, erythema, purulence, or signs of infection. Mild to moderate swelling at foot.      Assessment:  Post-op visit 4 weeks    Plan: Discussed with Dr. Hurst. Silver Aquacel rope was applied to incision with ace wrap. She will change this dressing out every other day. She can wash her foot with soap and water, but do not soak or submerge foot in water. She will remain NWB on her heel. RTC on 7-30 for re-evaluation.

## 2020-07-30 ENCOUNTER — OFFICE VISIT (OUTPATIENT)
Dept: ORTHOPEDICS | Facility: CLINIC | Age: 50
End: 2020-07-30
Payer: MEDICAID

## 2020-07-30 VITALS — TEMPERATURE: 98 F | BODY MASS INDEX: 32.41 KG/M2 | HEIGHT: 63 IN

## 2020-07-30 DIAGNOSIS — Z09 FOLLOW-UP EXAMINATION AFTER ORTHOPEDIC SURGERY: Primary | ICD-10-CM

## 2020-07-30 PROCEDURE — 99024 PR POST-OP FOLLOW-UP VISIT: ICD-10-PCS | Mod: ,,, | Performed by: PHYSICIAN ASSISTANT

## 2020-07-30 PROCEDURE — 99999 PR PBB SHADOW E&M-EST. PATIENT-LVL III: CPT | Mod: PBBFAC,,, | Performed by: PHYSICIAN ASSISTANT

## 2020-07-30 PROCEDURE — 99999 PR PBB SHADOW E&M-EST. PATIENT-LVL III: ICD-10-PCS | Mod: PBBFAC,,, | Performed by: PHYSICIAN ASSISTANT

## 2020-07-30 PROCEDURE — 99024 POSTOP FOLLOW-UP VISIT: CPT | Mod: ,,, | Performed by: PHYSICIAN ASSISTANT

## 2020-07-30 PROCEDURE — 99213 OFFICE O/P EST LOW 20 MIN: CPT | Mod: PBBFAC | Performed by: PHYSICIAN ASSISTANT

## 2020-07-31 NOTE — PROGRESS NOTES
Ms. Colin is here today for a post-operative visit after a right calcaneus ostectomy by Dr. Le on 6-.  she reports that she is doing better.  No pain at this time.  she is not taking pain medication.  she denies fever, chills, and sweats since the time of the surgery. She has changed the silver aquacel rope dressing every other day and she says the incision is improving. She has been NWB on her heel. She had a little yellow drainage on her dressing yesterday.     Physical exam:  Dressing taken down.  Incision is clean, dry and almost intact.  The subcutaneous skin is visible on part of the incision but the fatty tissue is no longer visible. Overall improving with no signs of infection. Minimal bloody drainage seen when dressing was removed.     Assessment:  Post-op visit 5 weeks    Plan: Continue changing aquacel rope dressing every other day until incision is well healed. Remain NWB on heel until incision is healed. Can clean with soap and water daily but no soaking foot. RTC on 8-18 to re-check incision.

## 2021-03-31 ENCOUNTER — TELEPHONE (OUTPATIENT)
Dept: ORTHOPEDICS | Facility: CLINIC | Age: 51
End: 2021-03-31

## (undated) DEVICE — GAUZE SPONGE 4X4 12PLY

## (undated) DEVICE — SUT ETHILON 3/0 18IN PS-1

## (undated) DEVICE — DRAPE HEAD/BAR 40 X 27 W/ADH

## (undated) DEVICE — BLADE SURG #15 CARBON STEEL

## (undated) DEVICE — STOCKINET TUBULAR 1 PLY 6X60IN

## (undated) DEVICE — GLOVE BIOGEL ORTHOPEDIC 7.5

## (undated) DEVICE — DRESSING XEROFORM FOIL PK 1X8

## (undated) DEVICE — TRAY MINOR ORTHO

## (undated) DEVICE — PAD CAST SPECIALIST STRL 4

## (undated) DEVICE — SHOE POST-OP WOMEN/LG

## (undated) DEVICE — BANDAGE ESMARK 6X12

## (undated) DEVICE — SEE MEDLINE ITEM 146298

## (undated) DEVICE — DRAPE C-ARM MINI DISP

## (undated) DEVICE — APPLICATOR CHLORAPREP ORN 26ML

## (undated) DEVICE — SUT 0 VICRYL / CT-1

## (undated) DEVICE — SPONGE GAUZE 16PLY 4X4

## (undated) DEVICE — ELECTRODE REM PLYHSV RETURN 9

## (undated) DEVICE — BLADE SAGITTA 5/BX

## (undated) DEVICE — BLADE MICRO REC. LARGE CROSS C

## (undated) DEVICE — SEE MEDLINE ITEM 157150